# Patient Record
Sex: MALE | Race: WHITE | NOT HISPANIC OR LATINO | Employment: OTHER | URBAN - METROPOLITAN AREA
[De-identification: names, ages, dates, MRNs, and addresses within clinical notes are randomized per-mention and may not be internally consistent; named-entity substitution may affect disease eponyms.]

---

## 2022-09-20 NOTE — PROGRESS NOTES
[facility] GERIATRICS    PRIMARY CARE PROVIDER AND CLINIC:  Donald Lorenz, TERESITA CNP, [address] [facility] [address]  Chief Complaint   Patient presents with     [facility] Medical Record Number:  [id number]  Place of Service where encounter took place:  [facility] (senior care) [269]    Quintin Pereira  is a 87 year old  (1935), admitted to the above facility from tcu  due to care needs 9/22/22.    HPI:    Information obtained from patient,son Levi Pereira, [facility] chart review, facility staff, facility chart.   Medical history significant for CAD with 4 stents, paroxysmal  afib on coumadin, history of CVA, aortic stenosis s/p bioprosthetic AVR 2015, PVD, chronic stasis ulcers, HTN, hypothyroidism, hyperlipidemia, CKD stage 3, DDD of lumbar spine, BPH.     [place] and his son (who had not seen him for a few years)  brought him to the [facility] ED 8/14/2022 with 2 episodes of aphasia and confusion.  CT head showed no acute intracranial findings. INR was 1.4. Other labs within range. He was diagnosed with TIA, declined hospitalization and returned home. His son brought him back to the ED 8/16/2022 with ongoing symptoms. BP was elevated in the ED. MRI brain with no acute findings. Neurology consulted for possible TIA.  INR was subtherapeutic and heparin was started for bridging. Statin and metoprolol were started. Lisinopril and HCTZ were continued. TSH ws 0.53 and armour thyroid was continued. He discharged to [facility] tcu for rehab.     He was rehospitalized at [facility] 8/22-8/31/2022 with fever.  UTI was suspected and antibiotics were given in the ED. UC no growth. He received IVF for dehydration. Hospital course was complicated by delirium requiring seroquel and haldol (note that olanzapine caused sedation). Lisinopril and HCTZ were held for JANES with creatinine 1.6. Statin was  "dc'd. He very reluctantly agreed to COVID vaccine which was given 8/31/2022. He returned to the tcu for ongoing therapies. SLUMS 26/30. Haldol and seroquel were dc'd. Lisinopril also dc'd. Low dose HCTZ resumed for LE edema.     Reports he is tired but feeling better. States that he is \"anti traditional medicine\" and has a 2 page typed list of supplements that he wants to take. He's having some difficulty adjusting to the facility, doesn't care for the food and doesn't like people coming into his apartment. Not happy that meds are administered by staff. Reports sharp pain of his legs during the night. Reports that he has neuropathy but this pain is different. Has had wounds of both ankles intermittently for years. Noticed a scant amount of blood on the toilet paper after a BM today. Denies constipation. No urinary symptoms. Ambulates without a device. Independent with cares. Receiving home therapies and wound care from [facility].     CODE STATUS/ADVANCE DIRECTIVES DISCUSSION:  No CPR- Do NOT Intubate  DNR / DNI  ALLERGIES: No Known Allergies   PAST MEDICAL HISTORY:   Past Medical History:   Diagnosis Date     Bilateral leg edema      BPH (benign prostatic hyperplasia)      CAD (coronary artery disease)      Degenerative joint disease (DJD) of lumbar spine      Dyslipidemia      Encounter for current long-term use of anticoagulants      Essential hypertension      HTN (hypertension)      Open wound of right foot      Paroxysmal atrial fibrillation (H)     on warfarin therapy     Physical deconditioning      PVD (posterior vitreous detachment), right     status post right carotid endarterectomy.     Severe aortic stenosis     asymptomatic.     Stage 3 chronic kidney disease (H)      TIA (transient ischemic attack)       PAST SURGICAL HISTORY:   has a past surgical history that includes aortic valve replacement (07/2015); appendectomy; Carotid endarterectomy on the right (Right); hernia repair (Right); Radiofrequency " Ablation (Right); and tonsillectomy.  FAMILY HISTORY: family history includes Breast Cancer in his daughter; Cerebrovascular Disease in his father and mother; Hypertension in his son; Rheumatoid Arthritis in his father.  SOCIAL HISTORY:   reports that he has never smoked. He has never used smokeless tobacco. He reports current alcohol use of about 1.0 standard drink per week.  Patient's living condition: lived alone in [facility].  . 2 sons, Levi is primary contact. His other son lives in [place]     Post Discharge Medication Reconciliation Status:   [id number]}  Post Medication Reconciliation Status: Discharge medications reconciled and changed, see notes/orders      Current Outpatient Medications   Medication Sig     amLODIPine (NORVASC) 10 MG tablet Take 1 tablet (10 mg) by mouth daily     ARMOUR THYROID 60 MG tablet TAKE 1 TABLET BY MOUTH ONCE DAILY     hydrochlorothiazide (HYDRODIURIL) 12.5 MG tablet Take 12.5 mg by mouth daily     JANTOVEN ANTICOAGULANT 5 MG tablet Take 2.5 mg by mouth daily     melatonin 3 MG tablet Take 0.5 mg by mouth nightly as needed for sleep     metoprolol tartrate (LOPRESSOR) 25 MG tablet Take 12.5 mg by mouth 2 times daily     Multiple Vitamins-Minerals (CEROVITE SENIOR) TABS TAKE 1 TABLET BY MOUTH ONCE DAILY     nitroGLYcerin (NITROSTAT) 0.4 MG sublingual tablet Place 0.4 mg under the tongue 3 times daily as needed     ondansetron (ZOFRAN ODT) 4 MG ODT tab Take 4 mg by mouth 3 times daily as needed     tamsulosin (FLOMAX) 0.4 MG capsule TAKE TWO CAPSULES (0.8MG) BY MOUTH EVERY EVENING     warfarin ANTICOAGULANT (COUMADIN) 2 MG tablet Take 1 tablet (2 mg) by mouth daily     warfarin ANTICOAGULANT (COUMADIN) 2.5 MG tablet Take 1 tablet (2.5 mg) by mouth daily     No current facility-administered medications for this visit.       ROS:  10 point ROS of systems including Constitutional, Eyes, Respiratory, Cardiovascular, Gastroenterology, Genitourinary, Integumentary,  "Musculoskeletal, Psychiatric were all negative except for pertinent positives noted in my HPI.    Vitals:  /68   Pulse 61   Temp (!) 96.5  F (35.8  C)   Resp 14   Ht 1.727 m (5' 8\")   Wt 68.9 kg (152 lb)   SpO2 98%   BMI 23.11 kg/m    Exam:  GENERAL APPEARANCE:  Alert, in no distress  ENT:  [place], oropharynx clear  EYES:  conjunctiva and lids normal  RESP:  lungs clear to auscultation , no respiratory distress  CV:  regular rate and rhythm, no murmur, trace bilateral LE edema, +2 pedal pulses  ABDOMEN:  soft, non-tender, no distension, no masses  M/S:   sitting up on couch. CABRAL with good strength. No joint inflammation  SKIN: 2  superficial small open areas left ankle, clean and dry, no erythema. No wounds on right ankle/foot.Stasis discoloration both LE.  No rashes  PSYCH:  oriented X 3, memory impaired , affect and mood normal    Lab/Diagnostic data:  Recent labs in [facility] reviewed by me today.      ECHO 8/19/2022: EF 65%, moderately impaired diastolic function, mild MR and mild stenosis, mild TR, bioprosthetic aortic valve function normal     ASSESSMENT / PLAN:  (I48.0) Paroxysmal atrial fibrillation (H)  (primary encounter diagnosis)  Comment: rate controlled: 60-61, rhythm regular on exam   INR 3.5 today, checked by home care nurse. Has been on coumadin 2.5 mg daily.   Plan: hold coumadin tonight, then 2.5 mg daily. INR 10/14/2022. Continue metoprolol.     Discussed rationale for med admin by staff and concern re: potential interaction of supplements with coumadin with patient and son. May consider having staff set up meds and patient self admin. Son will follow up with staff.     (N18.32) Stage 3b chronic kidney disease (H)  Comment: new baseline creatinine ~1.6  Plan: BMP. Avoid nephrotoxins. Renal dose meds     (I73.9) PVD (peripheral vascular disease) (H)  (I87.2,  L97.321) Venous stasis ulcer of left ankle limited to breakdown of skin without varicose veins (H)  Comment: intermittent ulcers both " LE for several years. Right has healed, left almost healed   Plan: continue wound care per home care nurse-discussed with nurse today.     (I25.10) Coronary artery disease involving native heart without angina pectoris, unspecified vessel or lesion type  (I35.0) Aortic valve stenosis, etiology of cardiac valve disease unspecified  (Z95.2) S/P AVR (aortic valve replacement) in 2015   Comment: no chest pain or acute issues. LE edema improved. History of stent placement X 4.   He had follow up at [facility] 9/28/2022 with no changes.  Plan: continue antihypertensives. Will discuss resuming statin at next visit.     (Z86.73) History of TIA (transient ischemic attack) and stroke  Comment: cognition and functional status are back to baseline. Speech fluent   Plan: continue antihypertensives.     HTN  Comment: controlled with BPs: 130/68, 118/78, outlier 167/70  Plan: continue HCTZ, amlodipine, metoprolol. Monitor VS and adjust meds as indicated.     (E03.9) Hypothyroidism, unspecified type  Comment: TSH 3.84 on 8/24/2022. He and his son request a recheck   Plan: TSH. Continue armour thyroid 60 mg daily .    (M47.816) Spondylosis of lumbar region without myelopathy or radiculopathy  Comment: LE pain during the night possibly related to DDD and/or neuropathy. He declines trying tylenol or gabapentin   Plan: monitor     (N40.0) Benign prostatic hyperplasia without lower urinary tract symptoms  Comment: symptoms managed   Plan: continue tamsulosin     (R53.81) Physical deconditioning  Comment: due to recent hospitalizations, multiple comorbidities   Plan: continue home PHYSICAL THERAPY/OT     (Z71.89) Advanced directives, counseling/discussion  Comment: he has a healthcare directive and confirms DNR/DNI  Plan: orders updated, POLST completed         Total time spent with [facility] visit at the [facility]  fac[facility] was 75 mins including review of past records and lengthy patient visit, discussion with sonChina Lozano  than 50% of total time spent with counseling and coordinating care due to establishing primary care at the facility. Counseling patient and son re: hospitalizations and treatments, tcu stay, medications and potential side effects, rationale for med admin by staff and home care services,  plan of care, advanced directive. Coordinating the following with facility staff and home care nurse: medication orders, coumadin dosing and INR recheck, wound care, plan of care, POLST.     Electronically signed by:  TERESITA Shipman CNP

## 2022-09-22 ENCOUNTER — TELEPHONE (OUTPATIENT)
Dept: GERIATRICS | Facility: CLINIC | Age: 87
End: 2022-09-22

## 2022-09-22 NOTE — TELEPHONE ENCOUNTER
Patient is new admit to [facility] nurse called reporting INR 1.9 today. He's on coumadin 2.5 mg daily. Last INR from tcu was 1.8 on 9/19/2022,  also on 2.5 mg daily. Per flow sheet,  INR was 3.4 when on coumadin 3 mg daily.   Afib and AVR, recent TIA    Order given for coumadin 5 mg po every Thurs, 2.5 mg all other days. Recheck INR in 1 week.       TERESITA Shipman CNP on 9/22/2022 at 2:55 PM

## 2022-09-22 NOTE — TELEPHONE ENCOUNTER
Quintin Pereira   1935      Orders 2022:  1. Coumadin 5 mg po daily every Th  2. Coumadin 2.5 mg po daily all other days  3. Home care nurse to check INR next week,  or 2022.       TERESITA Shipman CNP on 2022 at 3:01 PM

## 2022-09-28 ENCOUNTER — TELEPHONE (OUTPATIENT)
Dept: GERIATRICS | Facility: CLINIC | Age: 87
End: 2022-09-28

## 2022-09-28 NOTE — TELEPHONE ENCOUNTER
[facility] Geriatrics Triage Nurse INR     Provider: TERESITA Brasher CNP   Facility: [facility]  Facility Type:  AL    Caller: [facility] RN  Call Back Number: [phone]  Reason for call: INR  Diagnosis/Goal: A. Fib and AVR    Todays INR: 2.8  Last INR 1.9 on 9/22 - 5 mg Thur and 2.5 mg AOD    Heparin/Lovenox:  No  Currently on ABX?: No  Other interacting medication:  None  Missed or refused doses: No    Verbal Order/Direction given by Provider: Coumadin 2.5 mg PO daily. Recheck INR on 10/5/22.    Provider Giving Order:  Susi Cabezas MD    Verbal Order given to: Gemma Simpson RN

## 2022-09-29 DIAGNOSIS — Z78.9 TAKES DIETARY SUPPLEMENTS: Primary | ICD-10-CM

## 2022-09-29 DIAGNOSIS — N40.0 BPH (BENIGN PROSTATIC HYPERPLASIA): ICD-10-CM

## 2022-09-30 DIAGNOSIS — E03.9 HYPOTHYROIDISM, UNSPECIFIED TYPE: Primary | ICD-10-CM

## 2022-09-30 RX ORDER — TAMSULOSIN HYDROCHLORIDE 0.4 MG/1
CAPSULE ORAL
Qty: 180 CAPSULE | Refills: 4 | Status: SHIPPED | OUTPATIENT
Start: 2022-09-30

## 2022-09-30 RX ORDER — MULTIVIT-MIN/FA/LYCOPEN/LUTEIN .4-300-25
TABLET ORAL
Qty: 90 TABLET | Refills: 4 | Status: SHIPPED | OUTPATIENT
Start: 2022-09-30

## 2022-10-03 ENCOUNTER — DOCUMENTATION ONLY (OUTPATIENT)
Dept: OTHER | Facility: CLINIC | Age: 87
End: 2022-10-03

## 2022-10-05 ENCOUNTER — TELEPHONE (OUTPATIENT)
Dept: GERIATRICS | Facility: CLINIC | Age: 87
End: 2022-10-05

## 2022-10-05 RX ORDER — THYROID 60 MG
TABLET ORAL
Qty: 90 TABLET | Refills: 97 | Status: SHIPPED | OUTPATIENT
Start: 2022-10-05

## 2022-10-05 NOTE — TELEPHONE ENCOUNTER
Quintin Pereira   1935      Orders 10/5/2022:  1. Continue coumadin 2.5 mg po daily  2. Homecare nurse to recheck INR in 1 week-message left for [facility] nurse      TERESITA Shipman CNP on 10/5/2022 at 11:41 AM

## 2022-10-10 ENCOUNTER — TELEPHONE (OUTPATIENT)
Dept: GERIATRICS | Facility: CLINIC | Age: 87
End: 2022-10-10

## 2022-10-10 NOTE — TELEPHONE ENCOUNTER
Prior Authorization Retail Medication Request    Medication/Dose:   ICD code (if different than what is on RX):    Previously Tried and Failed:    Rationale:      Insurance Name:  [employer] PART D        Pharmacy Information (if different than what is on RX)  Name:  [facility]  Phone:  [phone]

## 2022-10-12 ENCOUNTER — TELEPHONE (OUTPATIENT)
Dept: GERIATRICS | Facility: CLINIC | Age: 87
End: 2022-10-12

## 2022-10-12 ENCOUNTER — ASSISTED LIVING VISIT (OUTPATIENT)
Dept: GERIATRICS | Facility: CLINIC | Age: 87
End: 2022-10-12
Payer: MEDICARE

## 2022-10-12 VITALS
HEART RATE: 61 BPM | BODY MASS INDEX: 23.04 KG/M2 | OXYGEN SATURATION: 98 % | HEIGHT: 68 IN | SYSTOLIC BLOOD PRESSURE: 130 MMHG | WEIGHT: 152 LBS | RESPIRATION RATE: 14 BRPM | DIASTOLIC BLOOD PRESSURE: 68 MMHG | TEMPERATURE: 96.5 F

## 2022-10-12 DIAGNOSIS — I87.2 VENOUS STASIS ULCER OF LEFT ANKLE LIMITED TO BREAKDOWN OF SKIN WITHOUT VARICOSE VEINS (H): ICD-10-CM

## 2022-10-12 DIAGNOSIS — L97.321 VENOUS STASIS ULCER OF LEFT ANKLE LIMITED TO BREAKDOWN OF SKIN WITHOUT VARICOSE VEINS (H): ICD-10-CM

## 2022-10-12 DIAGNOSIS — I48.0 PAROXYSMAL ATRIAL FIBRILLATION (H): Primary | ICD-10-CM

## 2022-10-12 DIAGNOSIS — I10 PRIMARY HYPERTENSION: ICD-10-CM

## 2022-10-12 DIAGNOSIS — N40.0 BENIGN PROSTATIC HYPERPLASIA WITHOUT LOWER URINARY TRACT SYMPTOMS: ICD-10-CM

## 2022-10-12 DIAGNOSIS — I73.9 PVD (PERIPHERAL VASCULAR DISEASE) (H): ICD-10-CM

## 2022-10-12 DIAGNOSIS — Z86.73 HISTORY OF TIA (TRANSIENT ISCHEMIC ATTACK) AND STROKE: ICD-10-CM

## 2022-10-12 DIAGNOSIS — M47.816 SPONDYLOSIS OF LUMBAR REGION WITHOUT MYELOPATHY OR RADICULOPATHY: ICD-10-CM

## 2022-10-12 DIAGNOSIS — E03.9 HYPOTHYROIDISM, UNSPECIFIED TYPE: ICD-10-CM

## 2022-10-12 DIAGNOSIS — Z71.89 ADVANCED DIRECTIVES, COUNSELING/DISCUSSION: ICD-10-CM

## 2022-10-12 DIAGNOSIS — I35.0 AORTIC VALVE STENOSIS, ETIOLOGY OF CARDIAC VALVE DISEASE UNSPECIFIED: ICD-10-CM

## 2022-10-12 DIAGNOSIS — N18.32 STAGE 3B CHRONIC KIDNEY DISEASE (H): ICD-10-CM

## 2022-10-12 DIAGNOSIS — R53.81 PHYSICAL DECONDITIONING: ICD-10-CM

## 2022-10-12 DIAGNOSIS — Z95.2 S/P AVR (AORTIC VALVE REPLACEMENT): ICD-10-CM

## 2022-10-12 DIAGNOSIS — I25.10 CORONARY ARTERY DISEASE INVOLVING NATIVE HEART WITHOUT ANGINA PECTORIS, UNSPECIFIED VESSEL OR LESION TYPE: ICD-10-CM

## 2022-10-12 PROBLEM — R41.0 DISORIENTATION: Status: ACTIVE | Noted: 2022-08-18

## 2022-10-12 PROBLEM — I87.331: Status: ACTIVE | Noted: 2020-07-23

## 2022-10-12 PROBLEM — I65.23 BILATERAL CAROTID ARTERY STENOSIS: Status: ACTIVE | Noted: 2018-11-08

## 2022-10-12 PROBLEM — E78.5 DYSLIPIDEMIA: Status: ACTIVE | Noted: 2018-11-08

## 2022-10-12 PROBLEM — R79.1 SUBTHERAPEUTIC INTERNATIONAL NORMALIZED RATIO (INR): Status: ACTIVE | Noted: 2022-08-18

## 2022-10-12 RX ORDER — METOPROLOL TARTRATE 25 MG/1
12.5 TABLET, FILM COATED ORAL 2 TIMES DAILY
COMMUNITY
Start: 2022-08-22 | End: 2022-10-27

## 2022-10-12 RX ORDER — NITROGLYCERIN 0.4 MG/1
0.4 TABLET SUBLINGUAL EVERY 5 MIN PRN
COMMUNITY

## 2022-10-12 RX ORDER — AMLODIPINE BESYLATE 10 MG/1
10 TABLET ORAL DAILY
COMMUNITY
Start: 2022-08-02 | End: 2022-10-27

## 2022-10-12 RX ORDER — ONDANSETRON 4 MG/1
4 TABLET, ORALLY DISINTEGRATING ORAL 3 TIMES DAILY PRN
COMMUNITY
Start: 2022-09-21

## 2022-10-12 RX ORDER — WARFARIN SODIUM 5 MG/1
2.5 TABLET ORAL DAILY
COMMUNITY
Start: 2022-09-23 | End: 2022-11-30

## 2022-10-12 RX ORDER — HYDROCHLOROTHIAZIDE 12.5 MG/1
12.5 TABLET ORAL DAILY
COMMUNITY
Start: 2022-08-02 | End: 2022-10-27

## 2022-10-12 RX ORDER — LANOLIN ALCOHOL/MO/W.PET/CERES
0.5 CREAM (GRAM) TOPICAL
COMMUNITY

## 2022-10-12 NOTE — TELEPHONE ENCOUNTER
Prior Authorization Approval    Authorization Effective Date: 10/12/2022  Authorization Expiration Date: 12/31/2023  Medication: ARMOUR THYROID 60 MG tablet - [place] APPROVED  Insurance Company: [employer] - Phone [phone] Fax [phone]  Which Pharmacy is filling the prescription (Not needed for infusion/clinic administered): [facility] - [address]  Pharmacy Notified: Yes  Patient Notified: Yes (pharmacy will deliver when ready)

## 2022-10-12 NOTE — PROGRESS NOTES
Quintin ALVARENGA 1935      Orders 10/12/2022:  1. Hold coumadin 10/12/2022 for INR 3.5  2. Coumadin 2.5 mg po daily on 10/13/2022  3. Home care nurse to draw INR Friday 10/14/2022  Orders also given to home care nurse      TERESITA Shipman CNP on 10/12/2022 at 2:45 PM

## 2022-10-12 NOTE — TELEPHONE ENCOUNTER
Prior Authorization Retail Medication Request    Medication/Dose: ARMOUR THYROID 60 MG tablet  ICD code (if different than what is on RX):    Previously Tried and Failed:    Rationale: TAKE 1 TABLET BY MOUTH ONCE DAILY     Insurance Name:  MEDICARE  Insurance ID:  [id number]       Pharmacy Information (if different than what is on RX)  [facility]: [facility]   [address]          [place], [place] [id number]       Phone:

## 2022-10-12 NOTE — LETTER
10/12/2022        RE: Quintin Pereira  [address]  [facility] [address]        [facility] GERIATRICS    PRIMARY CARE PROVIDER AND CLINIC:  Donald Lorenz APRN [facility], [address] [facility] [address]***  Chief Complaint   Patient presents with     [facility] Medical Record Number:  [id number]  Place of Service where encounter took place:  [facility] ([facility] [269]    Quintin Pereira  is a 87 year old  (1935), admitted to the above facility from home due to care needs 9/22/22. .   HPI:    ***    CODE STATUS/ADVANCE DIRECTIVES DISCUSSION:  No Order  DNR / DNI  ALLERGIES: No Known Allergies   PAST MEDICAL HISTORY:   Past Medical History:   Diagnosis Date     Bilateral leg edema      BPH (benign prostatic hyperplasia)      CAD (coronary artery disease)      Degenerative joint disease (DJD) of lumbar spine      Dyslipidemia      Encounter for current long-term use of anticoagulants      Essential hypertension      HTN (hypertension)      Open wound of right foot      Paroxysmal atrial fibrillation (H)     on warfarin therapy     Physical deconditioning      PVD (posterior vitreous detachment), right     status post right carotid endarterectomy.     Severe aortic stenosis     asymptomatic.     Stage 3 chronic kidney disease (H)      TIA (transient ischemic attack)       PAST SURGICAL HISTORY:   has a past surgical history that includes aortic valve replacement (07/2015); appendectomy; Carotid endarterectomy on the right (Right); hernia repair (Right); Radiofrequency Ablation (Right); and tonsillectomy.  FAMILY HISTORY: family history includes Breast Cancer in his daughter; Cerebrovascular Disease in his father and mother; Hypertension in his son; Rheumatoid Arthritis in his father.  SOCIAL HISTORY:   reports that he has never smoked. He has never used smokeless tobacco. He reports current alcohol use of about 1.0 standard drink per week.  Patient's living  "condition: lives with spouse    Post Discharge Medication Reconciliation Status:   {TIP  Click the link below to document, then refresh to pull in response :[id number]}  Post Medication Reconciliation Status:           Current Outpatient Medications   Medication Sig     amLODIPine (NORVASC) 10 MG tablet Take 1 tablet (10 mg) by mouth daily     ARMOUR THYROID 60 MG tablet TAKE 1 TABLET BY MOUTH ONCE DAILY     hydrochlorothiazide (HYDRODIURIL) 12.5 MG tablet Take 12.5 mg by mouth daily     JANTOVEN ANTICOAGULANT 5 MG tablet Take 2.5 mg by mouth daily     melatonin 3 MG tablet Take 0.5 mg by mouth nightly as needed for sleep     metoprolol tartrate (LOPRESSOR) 25 MG tablet Take 12.5 mg by mouth 2 times daily     Multiple Vitamins-Minerals (CEROVITE SENIOR) TABS TAKE 1 TABLET BY MOUTH ONCE DAILY     nitroGLYcerin (NITROSTAT) 0.4 MG sublingual tablet Place 0.4 mg under the tongue 3 times daily as needed     ondansetron (ZOFRAN ODT) 4 MG ODT tab Take 4 mg by mouth 3 times daily as needed     tamsulosin (FLOMAX) 0.4 MG capsule TAKE TWO CAPSULES (0.8MG) BY MOUTH EVERY EVENING     No current facility-administered medications for this visit.       ROS:  10 point ROS of systems including Constitutional, Eyes, Respiratory, Cardiovascular, Gastroenterology, Genitourinary, Integumentary, Musculoskeletal, Psychiatric were all negative except for pertinent positives noted in my HPI.    Vitals:  /68   Pulse 61   Temp (!) 96.5  F (35.8  C)   Resp 14   Ht 1.727 m (5' 8\")   Wt 68.9 kg (152 lb)   SpO2 98%   BMI 23.11 kg/m    Exam:  GENERAL APPEARANCE:  {prison GENERAL APPEARANCE:831325}  ENT:  {prison ENT PE:330809}  EYES:  {prison EYES PE :161821}  RESP:  {NURSING HOME RESP PE:461067}  CV:  {prison CV PE:555661}  ABDOMEN:  {NURSING HOME GI PE:965319}  M/S:   {NURSING HOME M/S PE:054749}  SKIN:  {NURSING HOME SKIN PE:945515}  NEURO:   {prison NEURO PE:447547}  PSYCH:  {NURSING HOME PSYCH " PE:446650}    Lab/Diagnostic data:  {fgslab:741867}    ASSESSMENT/PLAN:    {FGS DX2:759219}    Orders:  {fgsorders:358679}  ***    Total time spent with patient visit at the skilled nursing facility was {/3/:909933} including {1/2/3/4/5:792439}. Greater than 50% of total time spent with counseling and coordinating care due to ***.     Electronically signed by:  Gudelia Ortez MA ***                Quintin Pereira   1935      Orders 10/12/2022:  1. Hold coumadin 10/12/2022 for INR 3.5  2. Coumadin 2.5 mg po daily on 10/13/2022  3. Home care nurse to draw INR Friday 10/14/2022  Orders also given to home care nurse      TERESITA Shipman CNP on 10/12/2022 at 2:45 PM          Sincerely,        TERESITA Shipman CNP

## 2022-10-12 NOTE — TELEPHONE ENCOUNTER
Central Prior Authorization Team   Phone: [phone]      PA Initiation    Medication: ARMOUR THYROID 60 MG tablet - INITIATED  Insurance Company: [employer] - Phone [phone] Fax [phone]  Pharmacy Filling the Rx: [facility] - [address]  Filling Pharmacy Phone: [phone]  Filling Pharmacy Fax:    Start Date: 10/12/2022

## 2022-10-13 ENCOUNTER — LAB REQUISITION (OUTPATIENT)
Dept: LAB | Facility: CLINIC | Age: 87
End: 2022-10-13
Payer: MEDICARE

## 2022-10-13 DIAGNOSIS — N18.1 CHRONIC KIDNEY DISEASE, STAGE 1: ICD-10-CM

## 2022-10-13 DIAGNOSIS — D64.9 ANEMIA, UNSPECIFIED: ICD-10-CM

## 2022-10-13 DIAGNOSIS — E03.9 HYPOTHYROIDISM, UNSPECIFIED: ICD-10-CM

## 2022-10-13 NOTE — TELEPHONE ENCOUNTER
Central Prior Authorization Team   Phone: [phone]        Prior Authorization Not Needed per Insurance    Medication: ARMOUR THYROID 60 MG tablet--NOT NEEDED  Insurance Company: [employer] - Phone [phone] Fax [phone]  Expected CoPay:      Pharmacy Filling the Rx: [facility] - [place], [place] - [address] [place] [address]  Pharmacy Notified: Yes  Patient Notified: Yes PHARMACY WILL CONTACT WHEN FILLED

## 2022-10-14 ENCOUNTER — TELEPHONE (OUTPATIENT)
Dept: GERIATRICS | Facility: CLINIC | Age: 87
End: 2022-10-14

## 2022-10-14 DIAGNOSIS — I48.0 PAROXYSMAL ATRIAL FIBRILLATION (H): Primary | ICD-10-CM

## 2022-10-14 DIAGNOSIS — Z95.2 S/P AVR (AORTIC VALVE REPLACEMENT): ICD-10-CM

## 2022-10-14 RX ORDER — WARFARIN SODIUM 2 MG/1
2 TABLET ORAL DAILY
Qty: 10 TABLET | Refills: 1 | Status: SHIPPED | OUTPATIENT
Start: 2022-10-14 | End: 2022-11-30

## 2022-10-14 NOTE — TELEPHONE ENCOUNTER
Quintin Pereira   1935      INR 3.4 today per check by home care RN  Last INR 3.5 on 10/12/2022, coumadin was held X 1 then 2.5 mg on 10/13/2022.   Goal INR 2-3.       Orders 10/14/2022:  1. Hold coumadin 10/14/2022  2. Starting 10/15/2022: coumadin 2 mg po daily  3. Home care nurse to draw INR 10/19/2022  Script sent to pharmacy  Orders also given to [facility] RN      TERESITA Shipman CNP on 10/14/2022 at 12:08 PM

## 2022-10-18 LAB
ANION GAP SERPL CALCULATED.3IONS-SCNC: 12 MMOL/L (ref 7–15)
BUN SERPL-MCNC: 28.9 MG/DL (ref 8–23)
CALCIUM SERPL-MCNC: 9.1 MG/DL (ref 8.8–10.2)
CHLORIDE SERPL-SCNC: 101 MMOL/L (ref 98–107)
CREAT SERPL-MCNC: 1.74 MG/DL (ref 0.67–1.17)
DEPRECATED HCO3 PLAS-SCNC: 25 MMOL/L (ref 22–29)
ERYTHROCYTE [DISTWIDTH] IN BLOOD BY AUTOMATED COUNT: 13.5 % (ref 10–15)
GFR SERPL CREATININE-BSD FRML MDRD: 37 ML/MIN/1.73M2
GLUCOSE SERPL-MCNC: 115 MG/DL (ref 70–99)
HCT VFR BLD AUTO: 32.8 % (ref 40–53)
HGB BLD-MCNC: 11.2 G/DL (ref 13.3–17.7)
MCH RBC QN AUTO: 31.5 PG (ref 26.5–33)
MCHC RBC AUTO-ENTMCNC: 34.1 G/DL (ref 31.5–36.5)
MCV RBC AUTO: 92 FL (ref 78–100)
PLATELET # BLD AUTO: 153 10E3/UL (ref 150–450)
POTASSIUM SERPL-SCNC: 4.1 MMOL/L (ref 3.4–5.3)
RBC # BLD AUTO: 3.56 10E6/UL (ref 4.4–5.9)
SODIUM SERPL-SCNC: 138 MMOL/L (ref 136–145)
TSH SERPL DL<=0.005 MIU/L-ACNC: 1.75 UIU/ML (ref 0.3–4.2)
WBC # BLD AUTO: 5.2 10E3/UL (ref 4–11)

## 2022-10-18 PROCEDURE — P9604 ONE-WAY ALLOW PRORATED TRIP: HCPCS | Mod: ORL | Performed by: NURSE PRACTITIONER

## 2022-10-18 PROCEDURE — 85027 COMPLETE CBC AUTOMATED: CPT | Mod: ORL | Performed by: NURSE PRACTITIONER

## 2022-10-18 PROCEDURE — 84443 ASSAY THYROID STIM HORMONE: CPT | Mod: ORL | Performed by: NURSE PRACTITIONER

## 2022-10-18 PROCEDURE — 36415 COLL VENOUS BLD VENIPUNCTURE: CPT | Mod: ORL | Performed by: NURSE PRACTITIONER

## 2022-10-18 PROCEDURE — 80048 BASIC METABOLIC PNL TOTAL CA: CPT | Mod: ORL | Performed by: NURSE PRACTITIONER

## 2022-10-19 ENCOUNTER — TELEPHONE (OUTPATIENT)
Dept: GERIATRICS | Facility: CLINIC | Age: 87
End: 2022-10-19

## 2022-10-19 DIAGNOSIS — I48.0 PAROXYSMAL ATRIAL FIBRILLATION (H): Primary | ICD-10-CM

## 2022-10-19 DIAGNOSIS — Z95.2 S/P AVR (AORTIC VALVE REPLACEMENT): ICD-10-CM

## 2022-10-19 RX ORDER — WARFARIN SODIUM 2.5 MG/1
2.5 TABLET ORAL DAILY
Qty: 10 TABLET | Refills: 0 | Status: SHIPPED | OUTPATIENT
Start: 2022-10-19

## 2022-10-19 NOTE — TELEPHONE ENCOUNTER
Quintin ALVARENGA 1935    Orders 10/19/2022:  1. Increase coumadin to 2.5 mg po daily  2. Home care nurse to recheck INR in 1 week  Sent to pharmacy and orders given to home care nurse        TERESITA Shipman CNP on 10/19/2022 at 2:16 PM

## 2022-10-23 PROBLEM — I87.331: Status: RESOLVED | Noted: 2020-07-23 | Resolved: 2022-10-23

## 2022-10-26 ENCOUNTER — ASSISTED LIVING VISIT (OUTPATIENT)
Dept: GERIATRICS | Facility: CLINIC | Age: 87
End: 2022-10-26
Payer: MEDICARE

## 2022-10-26 VITALS
TEMPERATURE: 98.4 F | SYSTOLIC BLOOD PRESSURE: 142 MMHG | HEART RATE: 81 BPM | HEIGHT: 68 IN | BODY MASS INDEX: 23.04 KG/M2 | RESPIRATION RATE: 14 BRPM | WEIGHT: 152 LBS | DIASTOLIC BLOOD PRESSURE: 78 MMHG | OXYGEN SATURATION: 96 %

## 2022-10-26 DIAGNOSIS — I25.10 CORONARY ARTERY DISEASE INVOLVING NATIVE HEART WITHOUT ANGINA PECTORIS, UNSPECIFIED VESSEL OR LESION TYPE: ICD-10-CM

## 2022-10-26 DIAGNOSIS — R41.89 COGNITIVE IMPAIRMENT: ICD-10-CM

## 2022-10-26 DIAGNOSIS — I73.9 PVD (PERIPHERAL VASCULAR DISEASE) (H): Primary | ICD-10-CM

## 2022-10-26 DIAGNOSIS — I48.0 PAROXYSMAL ATRIAL FIBRILLATION (H): ICD-10-CM

## 2022-10-26 DIAGNOSIS — Z95.2 S/P AVR (AORTIC VALVE REPLACEMENT): ICD-10-CM

## 2022-10-26 DIAGNOSIS — I10 PRIMARY HYPERTENSION: ICD-10-CM

## 2022-10-26 DIAGNOSIS — E03.9 HYPOTHYROIDISM, UNSPECIFIED TYPE: ICD-10-CM

## 2022-10-26 DIAGNOSIS — N18.32 STAGE 3B CHRONIC KIDNEY DISEASE (H): ICD-10-CM

## 2022-10-26 DIAGNOSIS — N40.0 BENIGN PROSTATIC HYPERPLASIA WITHOUT LOWER URINARY TRACT SYMPTOMS: ICD-10-CM

## 2022-10-26 NOTE — LETTER
"    10/26/2022        RE: Quintin Pereira  [address] [facility] [address]        Quintin Pereira is a 87 year old male seen October 26, 2022 at [facility] where he has resided for one month (admit 9/2022) seen for initial visit.     Patient is seen in his apartment with his two sons present, and they help with history.  Pt notes nocturnal leg pain that is relieved by standing up to go in to the bathroom.   He is also quite upset about the [place] to [place] and would like to return to his home.  Doesn't like caregivers coming in to his apartment.  He was previously taking lots of OTC supplements (2 page typed list), wants to restart them   Reviewed that some may interfere with his warfarin and should restart a few at first.   Pt has had atrial fib past 8 years and states he was taking his own INR at home and calling results in to the clinic, and that his INRs had been stable then whereas they have varied more since his move.         Pt with CAD s/p stent placement, PAF anticoagulated with warfarin, aortic stenosis s/p AVR in 2015, PVD, stasis edema with ulceration, HTN, hypothyroidism, BPH, DDD and CKD3 who [place] [facility] [place] 25 years until he was hospitalized at [facility] in [facility] in August 2022   He was found with confusion and aphasia, not taking his medications at home and INR subtherapeutic.  He was initially dx'd with a TIA and returned home, but his son brought him back to the ED with ongoing symptoms.  Seen by Neurology and several medications added, including metoprolol and a statin.    He discharged to TCU but was rehospitalized at [facility] later in the month with fever to 102, dehydration and delirium; quetiapine and haloperidol were added, statin was discontinued.  He was treated with abx but source of fever unclear.   He improved and returned to TCU.      Pt unvaccinated, but \"forced\" to get the COVID vaccine so he could " "admit to TCU and then [place].   Remains unhappy about that.    In TCU he was treated for multiple ulcerations of bilateral ankles, which have now healed.       Past Medical History:   Diagnosis Date     Bilateral leg edema      BPH (benign prostatic hyperplasia)      CAD (coronary artery disease)      Degenerative joint disease (DJD) of lumbar spine      Dyslipidemia      Encounter for current long-term use of anticoagulants      Essential hypertension      HTN (hypertension)      Open wound of right foot      Paroxysmal atrial fibrillation (H)     on warfarin therapy     Physical deconditioning      PVD (posterior vitreous detachment), right     status post right carotid endarterectomy.     Severe aortic stenosis     asymptomatic.     Stage 3 chronic kidney disease (H)      TIA (transient ischemic attack)        Past Surgical History:   Procedure Laterality Date     AORTIC VALVE REPLACEMENT  07/2015     APPENDECTOMY       Carotid endarterectomy on the right Right      HERNIA REPAIR Right      RADIOFREQUENCY ABLATION Right     right great saphenous vein.     TONSILLECTOMY       SH:  Lived alone, cabin in [facility].   He has 2 sons and 2 daughters.  Son Levi lives in [place], other 3 live in [place], [place] and [place].     Non smoker     ROS: SLUMS 26/30 in TCU   Valdovinos balance test 44/56  TUG 23s    EXAM:  NAD  BP (!) 142/78   Pulse 81   Temp 98.4  F (36.9  C)   Resp 14   Ht 1.727 m (5' 8\")   Wt 68.9 kg (152 lb)   SpO2 96%   BMI 23.11 kg/m     Neck supple without adenopathy   +kyphosis.   Lungs clear bilaterally with fair air movement   Heart irreg irreg  Abd soft, NT, no distention or guarding, +BS  Ext with trace edema, ischemic and brawny skin changes bilaterally  Neuro: ambulatory without device  No focal findings   Psych: irritable and argumentative     Last Comprehensive Metabolic Panel:  Sodium   Date Value Ref Range Status   10/18/2022 138 136 - 145 mmol/L Final     Potassium   Date Value Ref Range Status "   10/18/2022 4.1 3.4 - 5.3 mmol/L Final     Chloride   Date Value Ref Range Status   10/18/2022 101 98 - 107 mmol/L Final     Carbon Dioxide (CO2)   Date Value Ref Range Status   10/18/2022 25 22 - 29 mmol/L Final     Anion Gap   Date Value Ref Range Status   10/18/2022 12 7 - 15 mmol/L Final     Glucose   Date Value Ref Range Status   10/18/2022 115 (H) 70 - 99 mg/dL Final     Urea Nitrogen   Date Value Ref Range Status   10/18/2022 28.9 (H) 8.0 - 23.0 mg/dL Final     Creatinine   Date Value Ref Range Status   10/18/2022 1.74 (H) 0.67 - 1.17 mg/dL Final     GFR Estimate   Date Value Ref Range Status   10/18/2022 37 (L) >60 mL/min/1.73m2 Final     Comment:     Effective December 21, 2021 eGFRcr in adults is calculated using the 2021 CKD-EPI creatinine equation which includes age and gender (Smooth et al., NE, DOI: 10.1056/EXDHeo0094285)     Calcium   Date Value Ref Range Status   10/18/2022 9.1 8.8 - 10.2 mg/dL Final     Lab Results   Component Value Date    WBC 5.2 10/18/2022      HGB 11.2 10/18/2022      MCV 92 10/18/2022       10/18/2022      CT HEAD WO IV CONT  8/23/2022   INDICATION: Mental status change, unknown cause   INTRACRANIAL CONTENTS: No intracranial hemorrhage, extraaxial collection, or mass effect.  No CT evidence of acute infarct. Mild volume loss and presumed chronic small vessel ischemia.   IMPRESSION:   1.  No acute intracranial abnormality.   2.  Age-related change.    ECHO 8/19/2022     1. Left ventricle: There is mild concentric left ventricular hypertrophy.   Global and regional systolic function appear normal. Ejection fraction approximately 65%.   2. Diastolic function appears moderately impaired. E-prime of 6. E-to-E prime ratio elevation 22 suggesting elevated left atrial pressure.   3. The right ventricle is not dilated. Contracts well.   4. The left atrium is severely dilated. Volume index of 62.   5. The right atrium appears moderately dilated. The interatrial septum is  difficult to image, but there is no evidence of shunt. Bubble study was   not done. It is difficult to image the septum.   6. Mitral valve is thickened with calcified annulus with mild regurgitation and mild stenosis. Mean gradient 3.3. Valve area estimated at 1.3 cm2.   7. Tricuspid valve has mild regurgitation. Regurgitant gradient was 31.   The inferior vena cava is not dilated, but collapses less than 50% suggesting central venous pressure of 8. This gives an estimated right   ventricular systolic pressure of 39 consistent with mild pulmonary hypertension.   8. The aortic valve by history is a porcine valve. It does have the appearance of a bioprosthetic valve. Appears to be functioning normally.   Peak gradient of 20. Mean gradient of 11.   9. Pulmonic valve is not well seen.   10. Great vessels: The aortic root is not dilated.     11. Pericardium: There is no pericardial effusion. No pleural effusion.       IMP/PLAN:   (I73.9) PVD (peripheral vascular disease) (H)   Comment: with ischemic changes in LEs, nocturnal rest pain and h/o ischemic ulcerations       Plan: reviewed with pt.   Imported to continue activity.   Monitor LEs for open areas.       (I48.0) Paroxysmal atrial fibrillation (H)  Comment:  INRs 1.6-3.4 since [place] admission.    Pulse Readings from Last 4 Encounters:   10/26/22 81   10/12/22 61      Plan: metoprolol 12.5 mg bid for VR control.   Warfarin per INR for stroke prophylaxis.       (I10) Primary hypertension  (N18.32) Stage 3b chronic kidney disease (H)  Comment: GFR 37  BP Readings from Last 3 Encounters:   10/26/22 (!) 142/78   10/12/22 130/68      Plan: metoprolol 12.5 mg bid, amlodipine 10 mg/day, hydrochlorothiazide 12.5 mg/day   Follow bps and BMP.       (I25.10) Coronary artery disease involving native heart without angina pectoris, unspecified vessel or lesion type  (Z95.2) S/P AVR (aortic valve replacement)  Comment: stent placement x4     Plan: He is anticoagulated and on a beta  blocker.    Not on a statin which was stopped at last hospitalization; may try restarting once he is more settled.         (E03.9) Hypothyroidism, unspecified type  TSH   Date Value Ref Range Status   10/18/2022 1.75 0.30 - 4.20 uIU/mL Final      Plan: remains on [place] thyroids 60 mg/day        (N40.0) Benign prostatic hyperplasia without lower urinary tract symptoms  Comment: with nocturia   Plan: tamsulosin 0.4 mg/day     (R41.89) Cognitive impairment  Comment: cog scores okay and he tracks fairly well, but seems to have gaps in insight and judgment.    Not doing well at home and lived remotely in setting of severe vascular ds and multiple co-morbidities       Plan: [place] support for med set up, meals, activity.     Pt wants to restart his many OTC supplements.   Agreed to start with just a few and follow his INR:  Will restart vit D, vit C, zinc, Mg and turmeric.     Reviewed with sons and questions answered        Susi Cabezas MD         Sincerely,        Susi Cabezas MD

## 2022-10-27 DIAGNOSIS — I10 PRIMARY HYPERTENSION: Primary | ICD-10-CM

## 2022-10-28 RX ORDER — AMLODIPINE BESYLATE 10 MG/1
TABLET ORAL
Qty: 90 TABLET | Refills: 4 | Status: SHIPPED | OUTPATIENT
Start: 2022-10-28

## 2022-10-28 RX ORDER — METOPROLOL TARTRATE 25 MG/1
TABLET, FILM COATED ORAL
Qty: 90 TABLET | Refills: 4 | Status: SHIPPED | OUTPATIENT
Start: 2022-10-28

## 2022-10-28 RX ORDER — HYDROCHLOROTHIAZIDE 12.5 MG/1
TABLET ORAL
Qty: 90 TABLET | Refills: 4 | Status: SHIPPED | OUTPATIENT
Start: 2022-10-28

## 2022-11-02 ENCOUNTER — TELEPHONE (OUTPATIENT)
Dept: GERIATRICS | Facility: CLINIC | Age: 87
End: 2022-11-02

## 2022-11-02 NOTE — TELEPHONE ENCOUNTER
Quintin Pereira   1935    INR 2.4 today     Orders 2022:  1. Coumadin 5 mg po daily every Wed  2. Coumadin 2.5 mg po daily all other days  3. INR in 2 weeks-home care nurse to draw    TERESITA Shipman CNP on 2022 at 12:11 PM

## 2022-11-12 NOTE — PROGRESS NOTES
"Quintin Pereira is a 87 year old male seen October 26, 2022 at [facility] where he has resided for one month (admit 9/2022) seen for initial visit.     Patient is seen in his apartment with his two sons present, and they help with history.  Pt notes nocturnal leg pain that is relieved by standing up to go in to the bathroom.   He is also quite upset about the move to [place] and would like to return to his home.  Doesn't like caregivers coming in to his apartment.  He was previously taking lots of OTC supplements (2 page typed list), wants to restart them   Reviewed that some may interfere with his warfarin and should restart a few at first.   Pt has had atrial fib past 8 years and states he was taking his own INR at home and calling results in to the clinic, and that his INRs had been stable then whereas they have varied more since his move.         Pt with CAD s/p stent placement, PAF anticoagulated with warfarin, aortic stenosis s/p AVR in 2015, PVD, stasis edema with ulceration, HTN, hypothyroidism, BPH, DDD and CKD3 who [place] [facility] past 25 years until he was hospitalized at [facility] in [facility] in August 2022   He was found with confusion and aphasia, not taking his medications at home and INR subtherapeutic.  He was initially dx'd with a TIA and returned home, but his son brought him back to the ED with ongoing symptoms.  Seen by Neurology and several medications added, including metoprolol and a statin.    He discharged to TCU but was rehospitalized at [facility] later in the month with fever to 102, dehydration and delirium; quetiapine and haloperidol were added, statin was discontinued.  He was treated with abx but source of fever unclear.   He improved and returned to TCU.      Pt unvaccinated, but \"forced\" to get the COVID vaccine so he could admit to [place] and then [place].   Remains unhappy about that.    In TCU he was treated for " "multiple ulcerations of bilateral ankles, which have now healed.       Past Medical History:   Diagnosis Date     Bilateral leg edema      BPH (benign prostatic hyperplasia)      CAD (coronary artery disease)      Degenerative joint disease (DJD) of lumbar spine      Dyslipidemia      Encounter for current long-term use of anticoagulants      Essential hypertension      HTN (hypertension)      Open wound of right foot      Paroxysmal atrial fibrillation (H)     on warfarin therapy     Physical deconditioning      PVD (posterior vitreous detachment), right     status post right carotid endarterectomy.     Severe aortic stenosis     asymptomatic.     Stage 3 chronic kidney disease (H)      TIA (transient ischemic attack)        Past Surgical History:   Procedure Laterality Date     AORTIC VALVE REPLACEMENT  07/2015     APPENDECTOMY       Carotid endarterectomy on the right Right      HERNIA REPAIR Right      RADIOFREQUENCY ABLATION Right     right great saphenous vein.     TONSILLECTOMY       SH:  Lived alone, cabin in [facility].   He has 2 sons and 2 daughters.  Son Levi lives in [place], other 3 live in [place], [place] and [place].     Non smoker     ROS: SLUMS 26/30 in TCU   Valdovinos balance test 44/56  TUG 23s    EXAM:  NAD  BP (!) 142/78   Pulse 81   Temp 98.4  F (36.9  C)   Resp 14   Ht 1.727 m (5' 8\")   Wt 68.9 kg (152 lb)   SpO2 96%   BMI 23.11 kg/m     Neck supple without adenopathy   +kyphosis.   Lungs clear bilaterally with fair air movement   Heart irreg irreg  Abd soft, NT, no distention or guarding, +BS  Ext with trace edema, ischemic and brawny skin changes bilaterally  Neuro: ambulatory without device  No focal findings   Psych: irritable and argumentative     Last Comprehensive Metabolic Panel:  Sodium   Date Value Ref Range Status   10/18/2022 138 136 - 145 mmol/L Final     Potassium   Date Value Ref Range Status   10/18/2022 4.1 3.4 - 5.3 mmol/L Final     Chloride   Date Value Ref Range Status   10/18/2022 " 101 98 - 107 mmol/L Final     Carbon Dioxide (CO2)   Date Value Ref Range Status   10/18/2022 25 22 - 29 mmol/L Final     Anion Gap   Date Value Ref Range Status   10/18/2022 12 7 - 15 mmol/L Final     Glucose   Date Value Ref Range Status   10/18/2022 115 (H) 70 - 99 mg/dL Final     Urea Nitrogen   Date Value Ref Range Status   10/18/2022 28.9 (H) 8.0 - 23.0 mg/dL Final     Creatinine   Date Value Ref Range Status   10/18/2022 1.74 (H) 0.67 - 1.17 mg/dL Final     GFR Estimate   Date Value Ref Range Status   10/18/2022 37 (L) >60 mL/min/1.73m2 Final     Comment:     Effective December 21, 2021 eGFRcr in adults is calculated using the 2021 CKD-EPI creatinine equation which includes age and gender (Smooth et al., NEJ, DOI: 10.1056/IIVHvp8237991)     Calcium   Date Value Ref Range Status   10/18/2022 9.1 8.8 - 10.2 mg/dL Final     Lab Results   Component Value Date    WBC 5.2 10/18/2022      HGB 11.2 10/18/2022      MCV 92 10/18/2022       10/18/2022      CT HEAD WO IV CONT  8/23/2022   INDICATION: Mental status change, unknown cause   INTRACRANIAL CONTENTS: No intracranial hemorrhage, extraaxial collection, or mass effect.  No CT evidence of acute infarct. Mild volume loss and presumed chronic small vessel ischemia.   IMPRESSION:   1.  No acute intracranial abnormality.   2.  Age-related change.    ECHO 8/19/2022     1. Left ventricle: There is mild concentric left ventricular hypertrophy.   Global and regional systolic function appear normal. Ejection fraction approximately 65%.   2. Diastolic function appears moderately impaired. E-prime of 6. E-to-E prime ratio elevation 22 suggesting elevated left atrial pressure.   3. The right ventricle is not dilated. Contracts well.   4. The left atrium is severely dilated. Volume index of 62.   5. The right atrium appears moderately dilated. The interatrial septum is difficult to image, but there is no evidence of shunt. Bubble study was   not done. It is difficult to  image the septum.   6. Mitral valve is thickened with calcified annulus with mild regurgitation and mild stenosis. Mean gradient 3.3. Valve area estimated at 1.3 cm2.   7. Tricuspid valve has mild regurgitation. Regurgitant gradient was 31.   The inferior vena cava is not dilated, but collapses less than 50% suggesting central venous pressure of 8. This gives an estimated right   ventricular systolic pressure of 39 consistent with mild pulmonary hypertension.   8. The aortic valve by history is a porcine valve. It does have the appearance of a bioprosthetic valve. Appears to be functioning normally.   Peak gradient of 20. Mean gradient of 11.   9. Pulmonic valve is not well seen.   10. Great vessels: The aortic root is not dilated.     11. Pericardium: There is no pericardial effusion. No pleural effusion.       IMP/PLAN:   (I73.9) PVD (peripheral vascular disease) (H)   Comment: with ischemic changes in LEs, nocturnal rest pain and h/o ischemic ulcerations       Plan: reviewed with pt.   Imported to continue activity.   Monitor LEs for open areas.       (I48.0) Paroxysmal atrial fibrillation (H)  Comment:  INRs 1.6-3.4 since [place] admission.    Pulse Readings from Last 4 Encounters:   10/26/22 81   10/12/22 61      Plan: metoprolol 12.5 mg bid for VR control.   Warfarin per INR for stroke prophylaxis.       (I10) Primary hypertension  (N18.32) Stage 3b chronic kidney disease (H)  Comment: GFR 37  BP Readings from Last 3 Encounters:   10/26/22 (!) 142/78   10/12/22 130/68      Plan: metoprolol 12.5 mg bid, amlodipine 10 mg/day, hydrochlorothiazide 12.5 mg/day   Follow bps and BMP.       (I25.10) Coronary artery disease involving native heart without angina pectoris, unspecified vessel or lesion type  (Z95.2) S/P AVR (aortic valve replacement)  Comment: stent placement x4     Plan: He is anticoagulated and on a beta blocker.    Not on a statin which was stopped at last hospitalization; may try restarting once he is more  settled.         (E03.9) Hypothyroidism, unspecified type  TSH   Date Value Ref Range Status   10/18/2022 1.75 0.30 - 4.20 uIU/mL Final      Plan: remains on [place] thyroids 60 mg/day        (N40.0) Benign prostatic hyperplasia without lower urinary tract symptoms  Comment: with nocturia   Plan: tamsulosin 0.4 mg/day     (R41.89) Cognitive impairment  Comment: cog scores okay and he tracks fairly well, but seems to have gaps in insight and judgment.    Not doing well at home and lived remotely in setting of severe vascular ds and multiple co-morbidities       Plan: [place] support for med set up, meals, activity.     Pt wants to restart his many OTC supplements.   Agreed to start with just a few and follow his INR:  Will restart vit D, vit C, zinc, Mg and turmeric.     Reviewed with sons and questions answered        Susi Cabezas MD

## 2022-11-30 ENCOUNTER — ASSISTED LIVING VISIT (OUTPATIENT)
Dept: GERIATRICS | Facility: CLINIC | Age: 87
End: 2022-11-30
Payer: MEDICARE

## 2022-11-30 VITALS
WEIGHT: 152 LBS | SYSTOLIC BLOOD PRESSURE: 134 MMHG | HEART RATE: 63 BPM | BODY MASS INDEX: 23.04 KG/M2 | DIASTOLIC BLOOD PRESSURE: 64 MMHG | HEIGHT: 68 IN | TEMPERATURE: 97.9 F | RESPIRATION RATE: 16 BRPM

## 2022-11-30 DIAGNOSIS — R41.89 COGNITIVE IMPAIRMENT: ICD-10-CM

## 2022-11-30 DIAGNOSIS — L97.321 VENOUS STASIS ULCER OF LEFT ANKLE LIMITED TO BREAKDOWN OF SKIN WITHOUT VARICOSE VEINS (H): Primary | ICD-10-CM

## 2022-11-30 DIAGNOSIS — Z95.2 S/P AVR (AORTIC VALVE REPLACEMENT): ICD-10-CM

## 2022-11-30 DIAGNOSIS — I48.0 PAROXYSMAL ATRIAL FIBRILLATION (H): ICD-10-CM

## 2022-11-30 DIAGNOSIS — I10 PRIMARY HYPERTENSION: ICD-10-CM

## 2022-11-30 DIAGNOSIS — I25.10 CORONARY ARTERY DISEASE INVOLVING NATIVE HEART WITHOUT ANGINA PECTORIS, UNSPECIFIED VESSEL OR LESION TYPE: ICD-10-CM

## 2022-11-30 DIAGNOSIS — E03.9 HYPOTHYROIDISM, UNSPECIFIED TYPE: ICD-10-CM

## 2022-11-30 DIAGNOSIS — I87.2 VENOUS STASIS ULCER OF LEFT ANKLE LIMITED TO BREAKDOWN OF SKIN WITHOUT VARICOSE VEINS (H): Primary | ICD-10-CM

## 2022-11-30 DIAGNOSIS — N18.32 STAGE 3B CHRONIC KIDNEY DISEASE (H): ICD-10-CM

## 2022-11-30 DIAGNOSIS — Z86.73 HISTORY OF TIA (TRANSIENT ISCHEMIC ATTACK) AND STROKE: ICD-10-CM

## 2022-11-30 DIAGNOSIS — I73.9 PVD (PERIPHERAL VASCULAR DISEASE) (H): ICD-10-CM

## 2022-11-30 NOTE — LETTER
11/30/2022        RE: Quintin Pereira  [address] [address]        [facility]    Chief Complaint   Patient presents with     RECHECK     HPI:  Quintin Pereria is a 87 year old  (1935), who is being seen today for an episodic care visit at: [facility]) [[id number]].   He moved to this facility 9/22/2022 from a tcu following hospitalization for altered mental status. Medical history significant for CAD with 4 stents, paroxysmal  afib on coumadin, history of CVA, aortic stenosis s/p bioprosthetic AVR 2015, PVD, chronic stasis ulcers, HTN, hypothyroidism, hyperlipidemia, CKD stage 3, DDD of lumbar spine, BPH.     [place] son brought him to the [facility] ED 8/14/2022 with 2 episodes of aphasia and confusion.  CT head showed no acute intracranial findings. INR was 1.4.  He was diagnosed with TIA, declined hospitalization and returned home. His son brought him back to the ED 8/16/2022 with ongoing symptoms. BP was elevated in the ED. MRI brain with no acute findings. Neurology consulted for possible TIA.  INR was subtherapeutic and heparin was started for bridging. Statin and metoprolol were started. Lisinopril and HCTZ were continued. TSH was 0.53 and armour thyroid was continued. He discharged to [facility] tcu for rehab.   He was rehospitalized at [facility] 8/22-8/31/2022 with fever.  UTI was suspected and antibiotics were given in the ED. UC no growth. He received IVF for dehydration. Hospital course was complicated by delirium requiring seroquel and haldol (note that olanzapine caused sedation). Lisinopril and HCTZ were held for JANES with creatinine 1.6. Statin was dc'd. He very reluctantly agreed to COVID vaccine which was given 8/31/2022. He returned to the tcu for ongoing therapies. SLUMS 26/30. Haldol and seroquel were dc'd. Lisinopril also dc'd. Low dose HCTZ was resumed  "while on tcu for LE edema.     Today's concern is:      Venous stasis ulcer of left ankle limited to breakdown of skin without varicose veins (H)  PVD (peripheral vascular disease) (H)  Paroxysmal atrial fibrillation (H)  Coronary artery disease involving native heart without angina pectoris, unspecified vessel or lesion type  S/P AVR (aortic valve replacement)  Primary hypertension  Stage 3b chronic kidney disease (H)  Hypothyroidism, unspecified type  Cognitive impairment  History of TIA (transient ischemic attack) and stroke   He is \"frustrated\" and says that \"things are tough.\" He is very unhappy about receiving AL services, including med administration and wound care. He wants to transition to [place] with no support from staff. He has his own INR machine and wants to monitor that independently. \" I did it all for years, there's no reason I can't do it now.\" Homecare ended as he has resumed driving and is no longer homebound. He is concerned about the wounds of his left ankle, states that they have been present for 2 months \"without improvement\", then states that he's had wounds of his left ankle \"for over 20 years.\" He denies feeling ill. Good appetite. Has restarted taking many OTC supplements. Ambulates with a cane. Independent with dressing and toileting. Receives assistance with bathing, which he wants to end.       Allergies, and PMH/PSH reviewed in EPIC today    REVIEW OF SYSTEMS:  4 point ROS including Respiratory, CV, GI and , other than that noted in the HPI,  is negative    Objective:   /64   Pulse 63   Temp 97.9  F (36.6  C)   Resp 16   Ht 1.727 m (5' 8\")   Wt 68.9 kg (152 lb)   BMI 23.11 kg/m    GENERAL APPEARANCE:  Alert, in no distress  ENT:  [place], oropharynx clear  EYES:  conjunctiva and lids normal  RESP:  lungs clear to auscultation   CV:  irregular rate and rhythm, no murmur, trace bilateral LE edema, faint pedal pulses on the left   ABDOMEN:  soft, non-tender, no distension, no " masses  M/S:   gait steady with cane.  CABRAL with good strength. No joint inflammation  SKIN: superficial small open areas left ankle and inner lower left leg, all areas with scant serous drainage, no erythema.  No wounds of RLE. Stasis discoloration both LE.  No rashes  PSYCH:  oriented X 3, memory impaired, irritable     Recent labs in EPIC reviewed by me today.     ASSESSMENT / PLAN:  (I87.2,  L97.321) Venous stasis ulcer of left ankle limited to breakdown of skin without varicose veins (H)  (primary encounter diagnosis)  (I73.9) PVD (peripheral vascular disease) (H)  Comment: chronic ulcers, no s/s of infection. He's had intermittent ulcers of his LLE for several years. He prefers to care for the wounds himself as he wants to drop AL services.   Plan: continue wound care using a foam dressing every 3 days and prn. We discussed a Vascular consult, which he will consider-also discussed this with his son.     (I48.0) Paroxysmal atrial fibrillation (H)  Comment: rate controlled: 63-64. Rhythm slightly irregular today  INRs have been highly variable,suspect this is partly due to the numerous supplements he takes. Last INR was 3.5 on 11/16/2022, coumadin was held X 1, then 2.5 mg daily (his usual home dose). INR was ordered to be checked by home care nurse today, but home care ended as he is not homebound   Plan: coumadin 2.5 mg daily. INR next week on routine lab day. Continue metoprolol.     (I25.10) Coronary artery disease involving native heart without angina pectoris, unspecified vessel or lesion type  (Z95.2) S/P AVR (aortic valve replacement) in 2015   Comment: no acute cardiac symptoms   History of stent X 4   Plan: continue antihypertensives. Follow up with [facility] as scheduled     (I10) Primary hypertension  Comment: controlled with BPs: 134/65, 128/74    Lisinopril was dc'd during inpatient stay 8/2022  Plan: continue low dose HCTZ, amlodipine, metoprolol. Monitor VS and adjust meds as  "indicated.     (N18.32) Stage 3b chronic kidney disease (H)  Comment: creatinine 1.74 on 10/18/2022, up slightly from baseline 1.6  Plan: encourage fluids. Follow BMP. Avoid nephrotoxins.     (E03.9) Hypothyroidism, unspecified type  Comment: TSH 1.75 on 10/18/2022  Plan: continue [place] thyroid 60 mg daily     (R41.89) Cognitive impairment   (Z86.73) History of TIA (transient ischemic attack) and stroke  Comment: cognition and functional status are back to baseline. Home care services ended because he is driving and not homebound.   He is irritable and antagonistic re: receiving AL services, med admin, staff coming in his apt, etc.   Plan: discussed with his son Levi Pereira, who has a good understanding of the situation. It would be acceptable to transition to Independent Living, now that he's near family and his son can supervise. The main concern is the wounds of his LE, but he has managed these at home \"for years.\"  If he fails, AL services can be resumed. He will need to establish a community PCP as we don't follow patients in the [place]. Recommend a PCP within the [facility] system to make it easier to coordinate care with his cardiologist. Son will follow up with facility staff.         Electronically signed by: TERESITA Shipman CNP             Sincerely,        TERESITA Shipman CNP    "

## 2022-11-30 NOTE — PROGRESS NOTES
[facility]IATR[facility]    Chief Complaint   Patient presents with     RECHECK     HPI:  Quintin Pereira is a 87 year old  (1935), who is being seen today for an episodic care visit at: [facility] [[id number]].   He moved to this facility 9/22/2022 from a tcu following hospitalization for altered mental status. Medical history significant for CAD with 4 stents, paroxysmal  afib on coumadin, history of CVA, aortic stenosis s/p bioprosthetic AVR 2015, PVD, chronic stasis ulcers, HTN, hypothyroidism, hyperlipidemia, CKD stage 3, DDD of lumbar spine, BPH.     [place] and his son brought him to the [facility] ED 8/14/2022 with 2 episodes of aphasia and confusion.  CT head showed no acute intracranial findings. INR was 1.4.  He was diagnosed with TIA, declined hospitalization and returned home. His son brought him back to the ED 8/16/2022 with ongoing symptoms. BP was elevated in the ED. MRI brain with no acute findings. Neurology consulted for possible TIA.  INR was subtherapeutic and heparin was started for bridging. Statin and metoprolol were started. Lisinopril and HCTZ were continued. TSH was 0.53 and armour thyroid was continued. He discharged to [facility] tcu for rehab.   He was rehospitalized at [facility] 8/22-8/31/2022 with fever.  UTI was suspected and antibiotics were given in the ED. UC no growth. He received IVF for dehydration. Hospital course was complicated by delirium requiring seroquel and haldol (note that olanzapine caused sedation). Lisinopril and HCTZ were held for JANES with creatinine 1.6. Statin was dc'd. He very reluctantly agreed to COVID vaccine which was given 8/31/2022. He returned to the tcu for ongoing therapies. SLUMS 26/30. Haldol and seroquel were dc'd. Lisinopril also dc'd. Low dose HCTZ was resumed while on tcu for LE edema.     Today's concern is:      Venous stasis ulcer of left  "ankle limited to breakdown of skin without varicose veins (H)  PVD (peripheral vascular disease) (H)  Paroxysmal atrial fibrillation (H)  Coronary artery disease involving native heart without angina pectoris, unspecified vessel or lesion type  S/P AVR (aortic valve replacement)  Primary hypertension  Stage 3b chronic kidney disease (H)  Hypothyroidism, unspecified type  Cognitive impairment  History of TIA (transient ischemic attack) and stroke   He is \"frustrated\" and says that \"things are tough.\" He is very unhappy about receiving AL services, including med administration and wound care. He wants to transition to [place] with no support from staff. He has his own INR machine and wants to monitor that independently. \" I did it all for years, there's no reason I can't do it now.\" Homecare ended as he has resumed driving and is no longer homebound. He is concerned about the wounds of his left ankle, states that they have been present for 2 months \"without improvement\", then states that he's had wounds of his left ankle \"for over 20 years.\" He denies feeling ill. Good appetite. Has restarted taking many OTC supplements. Ambulates with a cane. Independent with dressing and toileting. Receives assistance with bathing, which he wants to end.       Allergies, and PMH/PSH reviewed in EPIC today    REVIEW OF SYSTEMS:  4 point ROS including Respiratory, CV, GI and , other than that noted in the HPI,  is negative    Objective:   /64   Pulse 63   Temp 97.9  F (36.6  C)   Resp 16   Ht 1.727 m (5' 8\")   Wt 68.9 kg (152 lb)   BMI 23.11 kg/m    GENERAL APPEARANCE:  Alert, in no distress  ENT:  [place], oropharynx clear  EYES:  conjunctiva and lids normal  RESP:  lungs clear to auscultation   CV:  irregular rate and rhythm, no murmur, trace bilateral LE edema, faint pedal pulses on the left   ABDOMEN:  soft, non-tender, no distension, no masses  M/S:   gait steady with cane.  CABRAL with good strength. No joint " inflammation  SKIN: superficial small open areas left ankle and inner lower left leg, all areas with scant serous drainage, no erythema.  No wounds of RLE. Stasis discoloration both LE.  No rashes  PSYCH:  oriented X 3, memory impaired, irritable     Recent labs in EPIC reviewed by me today.     ASSESSMENT / PLAN:  (I87.2,  L97.321) Venous stasis ulcer of left ankle limited to breakdown of skin without varicose veins (H)  (primary encounter diagnosis)  (I73.9) PVD (peripheral vascular disease) (H)  Comment: chronic ulcers, no s/s of infection. He's had intermittent ulcers of his LLE for several years. He prefers to care for the wounds himself as he wants to drop AL services.   Plan: continue wound care using a foam dressing every 3 days and prn. We discussed a Vascular consult, which he will consider-also discussed this with his son.     (I48.0) Paroxysmal atrial fibrillation (H)  Comment: rate controlled: 63-64. Rhythm slightly irregular today  INRs have been highly variable,suspect this is partly due to the numerous supplements he takes. Last INR was 3.5 on 11/16/2022, coumadin was held X 1, then 2.5 mg daily (his usual home dose). INR was ordered to be checked by home care nurse today, but home care ended as he is not homebound   Plan: coumadin 2.5 mg daily. INR next week on routine lab day. Continue metoprolol.     (I25.10) Coronary artery disease involving native heart without angina pectoris, unspecified vessel or lesion type  (Z95.2) S/P AVR (aortic valve replacement) in 2015   Comment: no acute cardiac symptoms   History of stent X 4   Plan: continue antihypertensives. Follow up with [facility] as scheduled     (I10) Primary hypertension  Comment: controlled with BPs: 134/65, 128/74    Lisinopril was dc'd during inpatient stay 8/2022  Plan: continue low dose HCTZ, amlodipine, metoprolol. Monitor VS and adjust meds as indicated.     (N18.32) Stage 3b chronic kidney disease (H)  Comment:  "creatinine 1.74 on 10/18/2022, up slightly from baseline 1.6  Plan: encourage fluids. Follow BMP. Avoid nephrotoxins.     (E03.9) Hypothyroidism, unspecified type  Comment: TSH 1.75 on 10/18/2022  Plan: continue [place] thyroid 60 mg daily     (R41.89) Cognitive impairment   (Z86.73) History of TIA (transient ischemic attack) and stroke  Comment: cognition and functional status are back to baseline. Home care services ended because he is driving and not homebound.   He is irritable and antagonistic re: receiving AL services, med admin, staff coming in his apt, etc.   Plan: discussed with his son Levi Pereira, who has a good understanding of the situation. It would be acceptable to transition to Independent Living, now that he's near family and his son can supervise. The main concern is the wounds of his LE, but he has managed these at home \"for years.\"  If he fails, AL services can be resumed. He will need to establish a community PCP as we don't follow patients in the [place]. Recommend a PCP within the [facility] system to make it easier to coordinate care with his cardiologist. Son will follow up with facility staff.         Electronically signed by: TERESITA Shipman CNP       "

## 2022-12-07 ENCOUNTER — TELEPHONE (OUTPATIENT)
Dept: GERIATRICS | Facility: CLINIC | Age: 87
End: 2022-12-07

## 2022-12-07 NOTE — TELEPHONE ENCOUNTER
Quintin Pereira   1935    Orders 2022 for INR 2.7:    1. Continue coumadin 2.5 mg po daily  2. Recheck INR Mon 2023      TERESITA Shipman CNP on 2022 at 2:00 PM

## 2022-12-14 ENCOUNTER — DOCUMENTATION ONLY (OUTPATIENT)
Dept: GERIATRICS | Facility: CLINIC | Age: 87
End: 2022-12-14

## 2022-12-15 NOTE — PROGRESS NOTES
Patient has transferred from the AL at [facility] to Independent Living. [facility] doesn't follow patients in [place] without services.   He has established primary care with TERESITA Vieyra CNP on 12/14/2022 at 6:41 PM